# Patient Record
Sex: FEMALE | NOT HISPANIC OR LATINO | Employment: UNEMPLOYED | URBAN - METROPOLITAN AREA
[De-identification: names, ages, dates, MRNs, and addresses within clinical notes are randomized per-mention and may not be internally consistent; named-entity substitution may affect disease eponyms.]

---
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
## 2021-03-23 ENCOUNTER — TRANSFERRED RECORDS (OUTPATIENT)
Dept: HEALTH INFORMATION MANAGEMENT | Facility: CLINIC | Age: 21
End: 2021-03-23

## 2023-06-07 ENCOUNTER — MEDICAL CORRESPONDENCE (OUTPATIENT)
Dept: HEALTH INFORMATION MANAGEMENT | Facility: CLINIC | Age: 23
End: 2023-06-07
Payer: COMMERCIAL

## 2023-09-14 ENCOUNTER — HOSPITAL ENCOUNTER (EMERGENCY)
Facility: CLINIC | Age: 23
Discharge: HOME | End: 2023-09-14
Attending: EMERGENCY MEDICINE | Admitting: EMERGENCY MEDICINE
Payer: COMMERCIAL

## 2023-09-14 VITALS
DIASTOLIC BLOOD PRESSURE: 71 MMHG | TEMPERATURE: 98.4 F | HEART RATE: 110 BPM | WEIGHT: 125 LBS | SYSTOLIC BLOOD PRESSURE: 112 MMHG | OXYGEN SATURATION: 99 % | BODY MASS INDEX: 18.51 KG/M2 | HEIGHT: 69 IN | RESPIRATION RATE: 18 BRPM

## 2023-09-14 DIAGNOSIS — R06.02 SHORTNESS OF BREATH: ICD-10-CM

## 2023-09-14 DIAGNOSIS — R11.0 NAUSEA: ICD-10-CM

## 2023-09-14 DIAGNOSIS — R42 DIZZINESS: ICD-10-CM

## 2023-09-14 DIAGNOSIS — R10.9 ABDOMINAL PAIN, UNSPECIFIED ABDOMINAL LOCATION: ICD-10-CM

## 2023-09-14 LAB
ALBUMIN SERPL BCG-MCNC: 4.5 G/DL (ref 3.5–5.2)
ALP SERPL-CCNC: 47 U/L (ref 35–104)
ALT SERPL W P-5'-P-CCNC: 12 U/L (ref 0–50)
ANION GAP SERPL CALCULATED.3IONS-SCNC: 11 MMOL/L (ref 7–15)
AST SERPL W P-5'-P-CCNC: 15 U/L (ref 0–45)
BILIRUB SERPL-MCNC: 0.2 MG/DL
BUN SERPL-MCNC: 6.6 MG/DL (ref 6–20)
CALCIUM SERPL-MCNC: 8.9 MG/DL (ref 8.6–10)
CHLORIDE SERPL-SCNC: 108 MMOL/L (ref 98–107)
CREAT SERPL-MCNC: 0.48 MG/DL (ref 0.51–0.95)
DEPRECATED HCO3 PLAS-SCNC: 21 MMOL/L (ref 22–29)
EGFRCR SERPLBLD CKD-EPI 2021: >90 ML/MIN/1.73M2
ERYTHROCYTE [DISTWIDTH] IN BLOOD BY AUTOMATED COUNT: 12.1 % (ref 10–15)
GLUCOSE SERPL-MCNC: 87 MG/DL (ref 70–99)
HBA1C MFR BLD: 5 %
HCT VFR BLD AUTO: 36.3 % (ref 35–47)
HGB BLD-MCNC: 12.2 G/DL (ref 11.7–15.7)
IRON BINDING CAPACITY (ROCHE): 288 UG/DL (ref 240–430)
IRON SATN MFR SERPL: 21 % (ref 15–46)
IRON SERPL-MCNC: 61 UG/DL (ref 37–145)
MCH RBC QN AUTO: 30.7 PG (ref 26.5–33)
MCHC RBC AUTO-ENTMCNC: 33.6 G/DL (ref 31.5–36.5)
MCV RBC AUTO: 91 FL (ref 78–100)
PLATELET # BLD AUTO: 330 10E3/UL (ref 150–450)
POTASSIUM SERPL-SCNC: 3.7 MMOL/L (ref 3.4–5.3)
PROT SERPL-MCNC: 7.2 G/DL (ref 6.4–8.3)
RBC # BLD AUTO: 3.97 10E6/UL (ref 3.8–5.2)
SODIUM SERPL-SCNC: 140 MMOL/L (ref 136–145)
WBC # BLD AUTO: 7 10E3/UL (ref 4–11)

## 2023-09-14 PROCEDURE — 80053 COMPREHEN METABOLIC PANEL: CPT | Performed by: EMERGENCY MEDICINE

## 2023-09-14 PROCEDURE — 82728 ASSAY OF FERRITIN: CPT | Performed by: EMERGENCY MEDICINE

## 2023-09-14 PROCEDURE — 99283 EMERGENCY DEPT VISIT LOW MDM: CPT

## 2023-09-14 PROCEDURE — 85027 COMPLETE CBC AUTOMATED: CPT | Performed by: EMERGENCY MEDICINE

## 2023-09-14 PROCEDURE — 83525 ASSAY OF INSULIN: CPT | Performed by: EMERGENCY MEDICINE

## 2023-09-14 PROCEDURE — 84681 ASSAY OF C-PEPTIDE: CPT | Performed by: EMERGENCY MEDICINE

## 2023-09-14 PROCEDURE — 83036 HEMOGLOBIN GLYCOSYLATED A1C: CPT | Performed by: EMERGENCY MEDICINE

## 2023-09-14 PROCEDURE — 83550 IRON BINDING TEST: CPT | Performed by: EMERGENCY MEDICINE

## 2023-09-14 PROCEDURE — 36415 COLL VENOUS BLD VENIPUNCTURE: CPT | Performed by: EMERGENCY MEDICINE

## 2023-09-14 ASSESSMENT — ACTIVITIES OF DAILY LIVING (ADL): ADLS_ACUITY_SCORE: 33

## 2023-09-14 NOTE — DISCHARGE INSTRUCTIONS
We discussed and you understand that we have not done any testing or follow-up to determine the etiology of all of your symptoms.  You have declined further work-up including EKGs, CT scans and more diagnostic studies.  We cannot rule out many diagnostic possibilities given near multitude of presenting symptoms including disabling and life-threatening causes.  You can follow-up with the lab results with your PCP as we discussed.  Return to the emergency department with any new or worrisome symptoms.

## 2023-09-14 NOTE — ED PROVIDER NOTES
"  History     Chief Complaint:  Labs Only       The history is provided by the patient and a parent.      Carissa Mena is a 23 year old female with a history of hyperlipidemia who presents with request of blood draw. Patient reports she has been to different [facility] clinics because she wants to get her blood drawn. She reports her doctors at [facility] wanted to investigate her blood levels. Patient reports history of hypoglycemia, connective tissue disorder, anemia, POTS and PCOS. She also reports autoimmune disorder that is unspecified. She reports she was wearing a glucose monitor at some point as well. Patient notes chronic symptoms of dizziness, shortness of breath, diarrhea and back pain. Patient reports she has a CT scan scheduled for tomorrow as well. Patient reports she was seen today at [facility] and had 2 IV bags as well as an EKG taken.     Independent Historian:   Parent - They report above history.      Medications:    Malarone  Glucophage   Zoloft    Past Medical History:    Asthma  Anxiety   Hyperlipidemia   Pneumonia  Anemia   Hypothyroidism       Physical Exam   Patient Vitals for the past 24 hrs:   BP Temp Temp src Pulse Resp SpO2 Height Weight   09/14/23 1841 -- -- -- -- -- -- 1.753 m (5' 9\") 56.7 kg (125 lb)   09/14/23 1839 112/71 98.4  F (36.9  C) Temporal 110 18 99 % -- --        Physical Exam  Eyes:  The pupils are equal and round    Conjunctivae and sclerae are normal  ENT:    The nose is normal    Pinnae are normal  CV:  Well-perfused  Resp:  Normal respiratory effort    Speaks in full sentences  MS:  Normal muscular tone  Skin:  No rash or acute skin lesions noted  Neuro:   Awake, alert.      Speech is normal and fluent.    Face is symmetric.     Moves all extremities  Psych: Normal affect.  Appropriate interactions.    Emergency Department Course   Laboratory:  Labs Ordered and Resulted from Time of ED Arrival to Time of ED Departure   CBC WITH PLATELETS - Normal       Result Value "    WBC Count 7.0      RBC Count 3.97      Hemoglobin 12.2      Hematocrit 36.3      MCV 91      MCH 30.7      MCHC 33.6      RDW 12.1      Platelet Count 330     C-PEPTIDE   FERRITIN   INSULIN LEVEL          Emergency Department Course & Assessments:    Interventions:  Medications - No data to display     Assessments:  1841 I obtained history and examined the patient as noted above.     Independent Interpretation (X-rays, CTs, rhythm strip):  None    Consultations/Discussion of Management or Tests:  None        Social Determinants of Health affecting care:   None    Disposition:  The patient was discharged to home.     Impression & Plan      Medical Decision Making:  Carissa Mena is a 23 year old female who presents with desire to have outpatient laboratory work-up performed.  She has been following with a outpatient specialist who recommended labs to be performed and has a follow-up appointment upcoming soon.  She been having difficulty getting to a lab that can draw her blood tonight.  She like to have these blood samples taken here.  She brings a letter with a list of the recommended outpatient labs.    She notes a number of illnesses that she is currently being worked up for including POTS.  She notes that she has been lightheaded, dizzy.  She is also noted shortness of breath when her symptoms worsen.  She has had some intermittent abdominal pains.  She would not like any further testing done here in the emergency department and is only here to have her labs obtained.  She has a CT scan scheduled for tomorrow through outpatient provider and wishes to complete that test through them.    Explained to patient that without further diagnostics it cannot rule out other possibilities of the causes of her symptoms including pulmonary embolisms, vascular causes or other serious pathology which could be deadly or disabling.  Her and her mother both verbalized understanding of this and would just like her laboratory  work-up initiated here so that she can follow-up with her team as an outpatient.  Explained that if she is immediately discharged without results of labs that we cannot review them with her or go over abnormal results and she also verbalized understanding.    Overall patient appears healthy clinically.  Patient laboratory tests were ordered and she was discharged.      Diagnosis:    ICD-10-CM    1. Nausea  R11.0       2. Abdominal pain, unspecified abdominal location  R10.9       3. Shortness of breath  R06.02       4. Dizziness  R42            Discharge Medications:  Discharge Medication List as of 9/14/2023  6:59 PM             Scribe Disclosure:  SHANICE OREILLY PRINCESS, am serving as a scribe at 6:50 PM on 9/14/2023 to document services personally performed by Tha Smith MD based on my observations and the provider's statements to me.     9/14/2023   Tha Smith MD [place], Aaron Joseph, MD  09/15/23 0141

## 2023-09-14 NOTE — ED TRIAGE NOTES
Pt has unspecified autoimmune connective tissue disorder. States she is here for a lab draw to check her blood levels. States hx of anemia and hypoglycemia. States nausea and vomiting.

## 2023-09-15 LAB
C PEPTIDE SERPL-MCNC: 2.4 NG/ML (ref 0.9–6.9)
FERRITIN SERPL-MCNC: 26 NG/ML (ref 6–175)
INSULIN SERPL-ACNC: 12.9 UU/ML (ref 2.6–24.9)

## 2023-10-01 ENCOUNTER — HEALTH MAINTENANCE LETTER (OUTPATIENT)
Age: 23
End: 2023-10-01

## 2023-11-08 NOTE — CONFIDENTIAL NOTE
NOTES Status Details   OFFICE NOTE from referring provider     OFFICE NOTE from other specialist Care  Everywhere 0.14.2023 Ra Espana M.D. [place]      08.18.2023 Sally Dunbar M.D., Ph.D.  [place]   DISCHARGE SUMMARY from hospital     DISCHARGE REPORT from the ER     MEDICATION LIST Care Everywhere    LABS (Any and all labs)      Care Everywhere    Biopsy/pathology (Anything related to diagnoses I.e. fluid aspirations, lip biopsy, muscle biopsy)               Imaging (All imaging related to diagnoses)     Echo     HRCT     CXR     EMG                    Scleroderma/Dermatomyositis diagnoses     Previous Cardiology notes      Previous Pulmonary notes     Previous Dermatology notes     Previous GI notes     Lupus diagnoses     Previous Nephrology notes     Previous Dermatology notes     Previous Cardiology notes

## 2024-01-26 ENCOUNTER — PRE VISIT (OUTPATIENT)
Dept: RHEUMATOLOGY | Facility: CLINIC | Age: 24
End: 2024-01-26
Payer: COMMERCIAL

## 2024-11-24 ENCOUNTER — HEALTH MAINTENANCE LETTER (OUTPATIENT)
Age: 24
End: 2024-11-24